# Patient Record
Sex: FEMALE | Race: WHITE | URBAN - METROPOLITAN AREA
[De-identification: names, ages, dates, MRNs, and addresses within clinical notes are randomized per-mention and may not be internally consistent; named-entity substitution may affect disease eponyms.]

---

## 2018-08-15 ENCOUNTER — HOSPITAL ENCOUNTER (OUTPATIENT)
Dept: PHYSICAL THERAPY | Age: 23
Discharge: HOME | End: 2018-08-15
Payer: COMMERCIAL

## 2018-08-15 PROCEDURE — 97110 THERAPEUTIC EXERCISES: CPT

## 2018-08-15 PROCEDURE — 97161 PT EVAL LOW COMPLEX 20 MIN: CPT

## 2018-08-15 NOTE — THERAPY EVALUATION
Ramiro Navarro  : 1995  Primary: [address]  Secondary:  [address] at [address] [facility]  [address], [address], [address]  [phone]   [phone]          OUTPATIENT PHYSICAL THERAPY:Initial Assessment and Daily Note 8/15/2018    ICD-10: Treatment Diagnosis:   Myalgia (M79.1) Other lack of coordination (R27.8)Mixed incontinence (N39.46) Muscle weakness (generalized) (M62.81)  Precautions/Allergies:   Review of patient's allergies indicates no known allergies. Fall Risk Score: 0 (? 5 = High Risk)  MD Orders: Eval and treat MEDICAL/REFERRING DIAGNOSIS:  Other specified disorders of muscle [M62.89]   DATE OF ONSET: chronic   REFERRING PHYSICIAN: Iva Paul MD  RETURN PHYSICIAN APPOINTMENT: TBD     8/15/2018 INITIAL ASSESSMENT:  Ms. Ta Flattery  year old female with increased tone and muscle spasms of the pelvic floor that may be contributing to her leaking, pelvic pain and low back. She may also have some contributions from the pudendal n. She presents with tenderness at the external vaginal muscles as well as at the introitus and deep vaginal muscles of the LA and OI. She would benefit from skilled PT to address these deficits with the use of manual therapy, relaxation techniques, behavior modification and modalities as needed. PROBLEM LIST (Impacting functional limitations):  1. Increased Pain  2. Decreased Activity Tolerance  3. Decreased [place] with Home Exercise Program  4. Decreased strength and coordination of pelvic floor which limits bladder control   5. INTERVENTIONS PLANNED:  1. Neuromuscular re-education  2. Biofeedback as needed  3. HEP  4. Bladder retraining  5. Bladder education  6. Electrical Stimulation  7. Home Exercise Program (HEP)  8. Manual Therapy  9. Neuromuscular Re-education/Strengthening  10. Range of Motion (ROM)  11. Therapeutic Activites  12.  Therapeutic Exercise/Strengthening   TREATMENT PLAN:  Effective Dates: 8/15/2018 TO 11/13/2018 (90 days). Frequency/Duration: 1 time a week for 90 Days  GOALS: (Goals have been discussed and agreed upon with patient.)  Short-Term Functional Goals: Time Frame: in 3 weeks  1. Patient will demonstrate independence with home exercise program.  2. Patient will demonstrated good coordination of pelvic floor muscles to improve continence. Discharge Goals: Time Frame: in 12 weeks  Decrease resting tone of pelvic floor muscles to 0 mv on SEMG biofeedback to demonstrate ability to relax pelvic floor  Patient will sleep uninterrupted by excessive nocturia no greater than 2x per night. Pt will be able to participate in 30 min of aerobic activities without urinary incontinence. Pt will report a 50 % decrease in urinary incontinent episodes per voiding log in order to decrease social anxiety. Rehabilitation Potential For Stated Goals: Good  Regarding Toño Gutierresridge's therapy, I certify that the treatment plan above will be carried out by a therapist or under their direction. Thank you for this referral,  Kaylin Maza, PT     Referring Physician Signature: Jacek Muñoz MD              Date                    The information in this section was collected on 8/15/2018   (except where otherwise noted). HISTORY:   History of Present Injury/Illness (Reason for Referral):  Has always had frequency in urination. Had a baby 2016 and the frequency. Urinates every two hours. Second baby at 5 months had a foul smelling urine. Had pain on the right side. Pain is dependant on what she eats. Urine streams is two steady streams. Has pain instead of urgency which lets her know that she has to go to bathroom. Leaks about a teaspoon size per day. Second child was born last year. Has quite a bit of anxiety. Past Medical History/Comorbidities:   Ms. Tayler Walters  has a past medical history of IBS (irritable bowel syndrome).   Ms. Tayler Walters  has a past surgical history that includes hx tonsil and adenoidectomy. Social History/Living Environment:   Lives with fiance and two children  Prior Level of Function/Work/Activity:  Chronic issues with anxiety and urination  Previous Treatment Approaches:          URO/GYN  Lots of testing primary care. Checked out fine  Current Medications:    Current Outpatient Prescriptions:     norgestimate-ethinyl estradiol (TRI-LO-SPRINTEC) 0.18/0.215/0.25 mg-25 mcg tab, Take 1 Tab by mouth daily. , Disp: 28 Tab, Rfl: 3   Date Last Reviewed:  8/15/2018   Voiding Patterns:  Patient voids every 2 hours during the day and 2-3 times during the night. Patient reports that she empties bladder . Feels relief when she empties bladder in regards to pain. Fluid Intake: Patient does not limit fluid intake to control bladder. Drinks 40oz of water. Coffee 2 x per week. Bowel Habits:  Patient demonstrates normal bowel habits. Personal / Social History:  · Sexually active? Yes. Painful with insertion and after sex she has pain at the vulva and at some afters.    · Has had sexual trama       Number of Personal Factors/Comorbidities that affect the Plan of Care: 1-2: MODERATE COMPLEXITY   EXAMINATION:   Internal Observation:   · Voluntary Contraction: good with inability to relax    · Voluntary Relaxation: poor coordination   · Involuntary Contraction: NT  · Involuntary Relaxation: NT  · Perineal Body Assessment: tenderness  · Anal [place]: NT  · Skin Integrity:  Irritated rubor   · Q-tip Test: NT  · Vaginal Vault Size: within normal limits            Palpation:   Right Left   Bulbocavernosus     Ischiocavernosus     Superficial Transverse Perineal tenderness tenderness   Sphincter Urethrae     Compressor Urethra      Urethra-vaginalis     Deep Transverse Perineium tenderness tenderness   Obturator Internus Pain 6/10 Pain 3/10   Iliococcygeus Discomfort 2/10 Pain 3/10   Coccygeus NT Nt   Pubococcygeus Discomfort 2/10 Pain 7/10   Levator Ani     Adductor     Psoas Pain 6/10 Pain 2/10 Ant. Abdominal wall             Prolapse:  · Tissue support test with bearing down (Grade 1: not visible at introitus; Grade 2: visible at introitus; Grade 3: tissue outside introitus):  · Anterior Wall = 0  · Posterior Wall = 0   · Apical = 0       Body Structures Involved:  1. Muscles Body Functions Affected:  1. Genitourinary  2. Reproductive  3. Neuromusculoskeletal Activities and Participation Affected:  1. Self Care  2. Interpersonal Interactions and Relationships   Number of elements that affect the Plan of Care: 3: MODERATE COMPLEXITY   CLINICAL PRESENTATION:   Presentation: Evolving clinical presentation with changing clinical characteristics: MODERATE COMPLEXITY   CLINICAL DECISION MAKING:   Outcome Measure: Tool Used: Pelvic Floor Impact Questionnaireshort form 7 (PFIQ-7)   Score:  Initial:   · Bladder or Urine: 15  · Bowel or Rectum: 5  · Vagina or Pelvis: 4 Most Recent: X (Date: -- )  · Bladder or Urine: X  · Bowel or Rectum: X  · Vagina or Pelvis: X   Interpretation of Score: Each of the 7 sections is scored on a scale from 0-3; 0 representing \"Not at all\", 3 representing \"Quite a bit\". The mean value is taken from all the answered items, then multiplied by 100 to obtain the scale score, ranging from 0-100. This process is repeated for each column representing bowel, bladder, and pelvic pain. Medical Necessity:   · Patient is expected to demonstrate progress in coordination to increase independence with relaxation of pelvic floor muscles and improve strength. .  · Patient demonstrates good rehab potential due to higher previous functional level. Reason for Services/Other Comments:  · Patient continues to require skilled intervention due to improved continence.    Use of outcome tool(s) and clinical judgement create a POC that gives a: Questionable prediction of patient's progress: MODERATE COMPLEXITY   TREATMENT:   (In addition to Assessment/Re-Assessment sessions the following treatments were rendered)  Pre-treatment Symptoms/Complaints:  See above  Pain: Initial:   Pain Intensity 1: 3  Pain Location 1: Pelvis  Post Session:  3     THERAPEUTIC EXERCISE: (20 minutes):  Exercises per grid below to improve coordination. Required moderate verbal and tactile cues to promote proper body breathing techniques. Progressed repetitions and complexity of movement as indicated. (Used abbreviations: MET - muscle energy technique; SCS- Strain counter strain; CTM- Connective tissue mobilizations; CR- Contract/relax; SP- Sustained pressure, TrP- Trigger point release, IASTM- Instrument assisted soft tissue mobilizations, TDN- Trigger point dry needling). Exercises:  Patient instructed in pelvic floor exercises listed below:   Date:  8/15/2018     Activity/Exercise Parameters   Pt education  10 min see below   PF drops X 10 with tactile feedback   Diaphragmatic breathing 7 min                           [employer] Portal    The following educational topics were reviewed with patient:  Bladder health, tips to control urge, bladder diary, pelvic floor anatomy, how foods affect bladder, bladder retraining. Treatment/Session Assessment:    · Response to Treatment:   Pt reported good understanding of plan of care as well as exercises. All questions were answered and pt. Invited to call with any further questions or issues. Had no increased pain after treatment. · Compliance with Program/Exercises: Will assess as treatment progresses. · Recommendations/Intent for next treatment session: \"Next visit will focus on advancements to more challenging activities\".   Total Treatment Duration:  PT Patient Time In/Time Out  Time In: 1100  Time Out: 1200    Bindu Harris, PT

## 2018-08-15 NOTE — PROGRESS NOTES
Ambulatory/Rehab Services H2 Model Falls Risk Assessment    Risk Factor Pts. ·   Confusion/Disorientation/Impulsivity  []    4 ·   Symptomatic Depression  []   2 ·   Altered Elimination  []   1 ·   Dizziness/Vertigo  []   1 ·   Gender (Male)  []   1 ·   Any administered antiepileptics (anticonvulsants):  []   2 ·   Any administered benzodiazepines:  []   1 ·   Visual Impairment (specify):  []   1 ·   Portable Oxygen Use  []   1 ·   Orthostatic ? BP  []   1 ·   History of Recent Falls (within 3 mos.)  []   5     Ability to Rise from Chair (choose one) Pts. ·   Ability to rise in a single movement  [x]   0 ·   Pushes up, successful in one attempt  []   1 ·   Multiple attempts, but successful  []   3 ·   Unable to rise without assistance  []   4   Total: (5 or greater = High Risk) 0     Falls Prevention Plan:   []                Physical Limitations to Exercise (specify):   []                Mobility Assistance Device (type):   []                Exercise/Equipment Adaptation (specify):    ©2010 [facility] [place] Patent #[id number].  Federal Law prohibits the replication, distribution or use without written permission from [facility] [employer]

## 2018-08-22 ENCOUNTER — HOSPITAL ENCOUNTER (OUTPATIENT)
Dept: PHYSICAL THERAPY | Age: 23
Discharge: HOME | End: 2018-08-22
Payer: COMMERCIAL

## 2018-08-22 PROCEDURE — 97110 THERAPEUTIC EXERCISES: CPT

## 2018-08-22 PROCEDURE — 97140 MANUAL THERAPY 1/> REGIONS: CPT

## 2018-08-22 NOTE — PROGRESS NOTES
Naina Whatley  : 1995  Primary: [address]  Secondary:  [address] at [address] [facility]  [address] [facility], [address] [place] [address],[address], [address] [facility]  [phone]   [phone]          OUTPATIENT PHYSICAL THERAPY:Daily Note 2018    ICD-10: Treatment Diagnosis:   Myalgia (M79.1) Other lack of coordination (R27.8)Mixed incontinence (N39.46) Muscle weakness (generalized) (M62.81)  Precautions/Allergies:   Review of patient's allergies indicates no known allergies. Fall Risk Score: 0 (? 5 = High Risk)  MD Orders: Eval and treat MEDICAL/REFERRING DIAGNOSIS:  Other specified disorders of muscle [M62.89]   DATE OF ONSET: chronic   REFERRING PHYSICIAN: Rachid Conn MD  RETURN PHYSICIAN APPOINTMENT: TBD     8/15/2018 INITIAL ASSESSMENT:  Ms. Nina Guzmans  year old female with increased tone and muscle spasms of the pelvic floor that may be contributing to her leaking, pelvic pain and low back. She may also have some contributions from the pudendal n. She presents with tenderness at the external vaginal muscles as well as at the introitus and deep vaginal muscles of the LA and OI. She would benefit from skilled PT to address these deficits with the use of manual therapy, relaxation techniques, behavior modification and modalities as needed. PROBLEM LIST (Impacting functional limitations):  1. Increased Pain  2. Decreased Activity Tolerance  3. Decreased [place] with Home Exercise Program  4. Decreased strength and coordination of pelvic floor which limits bladder control   5. INTERVENTIONS PLANNED:  1. Neuromuscular re-education  2. Biofeedback as needed  3. HEP  4. Bladder retraining  5. Bladder education  6. Electrical Stimulation  7. Home Exercise Program (HEP)  8. Manual Therapy  9. Neuromuscular Re-education/Strengthening  10. Range of Motion (ROM)  11. Therapeutic Activites  12.  Therapeutic Exercise/Strengthening   TREATMENT PLAN:  Effective Dates: 8/15/2018 TO 11/13/2018 (90 days). Frequency/Duration: 1 time a week for 90 Days  GOALS: (Goals have been discussed and agreed upon with patient.)  Short-Term Functional Goals: Time Frame: in 3 weeks  1. Patient will demonstrate independence with home exercise program.  2. Patient will demonstrated good coordination of pelvic floor muscles to improve continence. Discharge Goals: Time Frame: in 12 weeks  Decrease resting tone of pelvic floor muscles to 0 mv on SEMG biofeedback to demonstrate ability to relax pelvic floor  Patient will sleep uninterrupted by excessive nocturia no greater than 2x per night. Pt will be able to participate in 30 min of aerobic activities without urinary incontinence. Pt will report a 50 % decrease in urinary incontinent episodes per voiding log in order to decrease social anxiety. Rehabilitation Potential For Stated Goals: Good  Regarding Vero Guzmán's therapy, I certify that the treatment plan above will be carried out by a therapist or under their direction. Thank you for this referral,  Rukhsana Ibanez, PT     Referring Physician Signature: Kasandra Long MD              Date                    The information in this section was collected on 8/22/2018   (except where otherwise noted). HISTORY:   History of Present Injury/Illness (Reason for Referral):  Has always had frequency in urination. Had a baby 2016 and the frequency. Urinates every two hours. Second baby at 5 months had a foul smelling urine. Had pain on the right side. Pain is dependant on what she eats. Urine streams is two steady streams. Has pain instead of urgency which lets her know that she has to go to bathroom. Leaks about a teaspoon size per day. Second child was born last year. Has quite a bit of anxiety. Past Medical History/Comorbidities:   Ms. Gerson Ferrer  has a past medical history of IBS (irritable bowel syndrome). Ms. Gesron Ferrer  has a past surgical history that includes hx tonsil and adenoidectomy.   Social History/Living Environment:   Lives with fiance and two children  Prior Level of Function/Work/Activity:  Chronic issues with anxiety and urination  Previous Treatment Approaches:          URO/GYN  Lots of testing primary care. Checked out fine  Current Medications:    Current Outpatient Prescriptions:     norgestimate-ethinyl estradiol (TRI-LO-SPRINTEC) 0.18/0.215/0.25 mg-25 mcg tab, Take 1 Tab by mouth daily. , Disp: 28 Tab, Rfl: 3   Date Last Reviewed:  8/22/2018   Voiding Patterns:  Patient voids every 2 hours during the day and 2-3 times during the night. Patient reports that she empties bladder . Feels relief when she empties bladder in regards to pain. Fluid Intake: Patient does not limit fluid intake to control bladder. Drinks 40oz of water. Coffee 2 x per week. Bowel Habits:  Patient demonstrates normal bowel habits. Personal / Social History:  · Sexually active? Yes. Painful with insertion and after sex she has pain at the vulva and at some afters. · Has had sexual trama       Number of Personal Factors/Comorbidities that affect the Plan of Care: 1-2: MODERATE COMPLEXITY   EXAMINATION:   Internal Observation:   · Voluntary Contraction: good with inability to relax    · Voluntary Relaxation: poor coordination   · Involuntary Contraction: NT  · Involuntary Relaxation: NT  · Perineal Body Assessment: tenderness  · Anal [place]: NT  · Skin Integrity:  Irritated rubor   · Q-tip Test: NT  · Vaginal Vault Size: within normal limits            Palpation:   Right Left   Bulbocavernosus     Ischiocavernosus     Superficial Transverse Perineal tenderness tenderness   Sphincter Urethrae     Compressor Urethra      Urethra-vaginalis     Deep Transverse Perineium tenderness tenderness   Obturator Internus Pain 6/10 Pain 3/10   Iliococcygeus Discomfort 2/10 Pain 3/10   Coccygeus NT Nt   Pubococcygeus Discomfort 2/10 Pain 7/10   Levator Ani     Adductor     Psoas Pain 6/10 Pain 2/10   Ant.  Abdominal wall Prolapse:  · Tissue support test with bearing down (Grade 1: not visible at introitus; Grade 2: visible at introitus; Grade 3: tissue outside introitus):  · Anterior Wall = 0  · Posterior Wall = 0   · Apical = 0       Body Structures Involved:  1. Muscles Body Functions Affected:  1. Genitourinary  2. Reproductive  3. Neuromusculoskeletal Activities and Participation Affected:  1. Self Care  2. Interpersonal Interactions and Relationships   Number of elements that affect the Plan of Care: 3: MODERATE COMPLEXITY   CLINICAL PRESENTATION:   Presentation: Evolving clinical presentation with changing clinical characteristics: MODERATE COMPLEXITY   CLINICAL DECISION MAKING:   Outcome Measure: Tool Used: Pelvic Floor Impact Questionnaireshort form 7 (PFIQ-7)   Score:  Initial:   · Bladder or Urine: 15  · Bowel or Rectum: 5  · Vagina or Pelvis: 4 Most Recent: X (Date: -- )  · Bladder or Urine: X  · Bowel or Rectum: X  · Vagina or Pelvis: X   Interpretation of Score: Each of the 7 sections is scored on a scale from 0-3; 0 representing \"Not at all\", 3 representing \"Quite a bit\". The mean value is taken from all the answered items, then multiplied by 100 to obtain the scale score, ranging from 0-100. This process is repeated for each column representing bowel, bladder, and pelvic pain. Medical Necessity:   · Patient is expected to demonstrate progress in coordination to increase independence with relaxation of pelvic floor muscles and improve strength. .  · Patient demonstrates good rehab potential due to higher previous functional level. Reason for Services/Other Comments:  · Patient continues to require skilled intervention due to improved continence.    Use of outcome tool(s) and clinical judgement create a POC that gives a: Questionable prediction of patient's progress: MODERATE COMPLEXITY   TREATMENT:   (In addition to Assessment/Re-Assessment sessions the following treatments were rendered)  Pre-treatment Symptoms/Complaints: Does have her menstrual cycle right now and has some cramping. Feels like the breathing is better. Pain: Initial:   Pain Intensity 1: 3  Pain Location 1: Pelvis 2/10 Post Session:  1/10     THERAPEUTIC EXERCISE: (10 minutes):  Exercises per grid below to improve coordination. Required moderate verbal and tactile cues to promote proper body breathing techniques. Progressed repetitions and complexity of movement as indicated. MANUAL THERAPY: (30 minutes): Soft tissue mobilization was utilized and necessary because of the patient's restricted motion of soft tissue. (Used abbreviations: MET - muscle energy technique; SCS- Strain counter strain; CTM- Connective tissue mobilizations; CR- Contract/relax; SP- Sustained pressure, TrP- Trigger point release, IASTM- Instrument assisted soft tissue mobilizations, TDN- Trigger point dry needling). Exercises:  Patient instructed in pelvic floor exercises listed below:   Date:  8/22/2018     Activity/Exercise Parameters   Pt education     PF drops X 10 with tactile feedback   Diaphragmatic breathing 5 min   Adductor stretch 3 x 30 sec    Perineum stretch 3 x 30 sec   Piriformis stertch 3 x 30 sec               [employer] Portal    The following educational topics were reviewed with patient:    Treatment/Session Assessment:    · Response to Treatment:   Pt states that she doesn't have vaginal pain, but pelvic pain feels good. Will do her stretching at home for HEP. · Compliance with Program/Exercises: Will assess as treatment progresses. · Recommendations/Intent for next treatment session: \"Next visit will focus on advancements to more challenging activities\".   Total Treatment Duration:  PT Patient Time In/Time Out  Time In: 1315  Time Out: 0200    Antoni Arguelles, PT

## 2018-08-29 ENCOUNTER — HOSPITAL ENCOUNTER (OUTPATIENT)
Dept: PHYSICAL THERAPY | Age: 23
Discharge: HOME | End: 2018-08-29
Payer: COMMERCIAL

## 2018-08-29 PROCEDURE — 97140 MANUAL THERAPY 1/> REGIONS: CPT

## 2018-08-29 PROCEDURE — 97110 THERAPEUTIC EXERCISES: CPT

## 2018-08-29 NOTE — PROGRESS NOTES
Bentley Matthew : 1995 Primary: [address] Secondary:  [facility] [address], [address], [address] [facility] [phone]   [phone] OUTPATIENT PHYSICAL THERAPY:Daily Note 2018 ICD-10: Treatment Diagnosis: Myalgia (M79.1) Other lack of coordination (R27.8)Mixed incontinence (N39.46) Muscle weakness (generalized) (M62.81) Precautions/Allergies:  
Review of patient's allergies indicates no known allergies. Fall Risk Score: 0 (? 5 = High Risk) MD Orders: Eval and treat MEDICAL/REFERRING DIAGNOSIS: 
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 
RETURN PHYSICIAN APPOINTMENT: TBD  
 
8/15/2018 INITIAL ASSESSMENT:  Ms. Rafael Abraham  year old female with increased tone and muscle spasms of the pelvic floor that may be contributing to her leaking, pelvic pain and low back. She may also have some contributions from the pudendal n. She presents with tenderness at the external vaginal muscles as well as at the introitus and deep vaginal muscles of the LA and OI. She would benefit from skilled PT to address these deficits with the use of manual therapy, relaxation techniques, behavior modification and modalities as needed. PROBLEM LIST (Impacting functional limitations): 
1. Increased Pain 2. Decreased Activity Tolerance 3. Decreased [place] with Home Exercise Program 
4. Decreased strength and coordination of pelvic floor which limits bladder control 5. INTERVENTIONS PLANNED: 
1. Neuromuscular re-education 2. Biofeedback as needed 3. HEP 4. Bladder retraining 5. Bladder education 6. Electrical Stimulation 7. Home Exercise Program (HEP) 8. Manual Therapy 9. Neuromuscular Re-education/Strengthening 10. Range of Motion (ROM) 11. Therapeutic Activites 12. Therapeutic Exercise/Strengthening TREATMENT PLAN: 
 Effective Dates: 8/15/2018 TO 11/13/2018 (90 days). Frequency/Duration: 1 time a week for 90 Days GOALS: (Goals have been discussed and agreed upon with patient.) Short-Term Functional Goals: Time Frame: in 3 weeks 1. Patient will demonstrate independence with home exercise program. 
2. Patient will demonstrated good coordination of pelvic floor muscles to improve continence. Discharge Goals: Time Frame: in 12 weeks Decrease resting tone of pelvic floor muscles to 0 mv on SEMG biofeedback to demonstrate ability to relax pelvic floor Patient will sleep uninterrupted by excessive nocturia no greater than 2x per night. Pt will be able to participate in 30 min of aerobic activities without urinary incontinence. Pt will report a 50 % decrease in urinary incontinent episodes per voiding log in order to decrease social anxiety. Rehabilitation Potential For Stated Goals: Good Regarding Malik Guzmán's therapy, I certify that the treatment plan above will be carried out by a therapist or under their direction. Thank you for this referral, Fernando Graham, PT Referring Physician Signature: Johanna Ascencio MD              Date The information in this section was collected on 8/29/2018 
 (except where otherwise noted). HISTORY:  
History of Present Injury/Illness (Reason for Referral): 
Has always had frequency in urination. Had a baby 2016 and the frequency. Urinates every two hours. Second baby at 5 months had a foul smelling urine. Had pain on the right side. Pain is dependant on what she eats. Urine streams is two steady streams. Has pain instead of urgency which lets her know that she has to go to bathroom. Leaks about a teaspoon size per day. Second child was born last year. Has quite a bit of anxiety. Past Medical History/Comorbidities:  
Ms. Aysha Ferguson  has a past medical history of IBS (irritable bowel syndrome). Ms. Pawel Sebastian  has a past surgical history that includes hx tonsil and adenoidectomy. Social History/Living Environment:  
Lives with fiance and two children Prior Level of Function/Work/Activity: 
Chronic issues with anxiety and urination Previous Treatment Approaches: URO/GYN  Lots of testing primary care. Checked out fine Current Medications:   
Current Outpatient Prescriptions:  
  norgestimate-ethinyl estradiol (TRI-LO-SPRINTEC) 0.18/0.215/0.25 mg-25 mcg tab, Take 1 Tab by mouth daily. , Disp: 28 Tab, Rfl: 3 Date Last Reviewed:  8/29/2018 Voiding Patterns:  Patient voids every 2 hours during the day and 2-3 times during the night. Patient reports that she empties bladder . Feels relief when she empties bladder in regards to pain. Fluid Intake: Patient does not limit fluid intake to control bladder. Drinks 40oz of water. Coffee 2 x per week. Bowel Habits:  Patient demonstrates normal bowel habits. Personal / Social History: 
· Sexually active? Yes. Painful with insertion and after sex she has pain at the vulva and at some afters. · Has had sexual trama Number of Personal Factors/Comorbidities that affect the Plan of Care: 1-2: MODERATE COMPLEXITY EXAMINATION:  
Internal Observation: · Voluntary Contraction: good with inability to relax · Voluntary Relaxation: poor coordination · Involuntary Contraction: NT 
· Involuntary Relaxation: NT 
· Perineal Body Assessment: tenderness · Anal [place]: NT 
· Skin Integrity:  Irritated rubor · Q-tip Test: NT 
· Vaginal Vault Size: within normal limits Palpation: 
 Right Left Bulbocavernosus Ischiocavernosus Superficial Transverse Perineal tenderness tenderness Sphincter Urethrae Compressor Urethra [place] Deep Transverse Perineium tenderness tenderness Obturator Internus Pain 6/10 Pain 3/10 Iliococcygeus Discomfort 2/10 Pain 3/10 Coccygeus NT Nt  
Pubococcygeus Discomfort 2/10 Pain 7/10 Levator Ani Adductor Psoas Pain 6/10 Pain 2/10 Ant. Abdominal wall Prolapse: · Tissue support test with bearing down (Grade 1: not visible at introitus; Grade 2: visible at introitus; Grade 3: tissue outside introitus): · Anterior Wall = 0 
· Posterior Wall = 0  
· Apical = 0 Body Structures Involved: 1. Muscles Body Functions Affected: 1. Genitourinary 2. Reproductive 3. Neuromusculoskeletal Activities and Participation Affected: 1. Self Care 2. Interpersonal Interactions and Relationships Number of elements that affect the Plan of Care: 3: MODERATE COMPLEXITY CLINICAL PRESENTATION:  
Presentation: Evolving clinical presentation with changing clinical characteristics: MODERATE COMPLEXITY CLINICAL DECISION MAKING:  
Outcome Measure: Tool Used: Pelvic Floor Impact Questionnaireshort form 7 (PFIQ-7) Score:  Initial: · Bladder or Urine: 15 · Bowel or Rectum: 5 · Vagina or Pelvis: 4 Most Recent: X (Date: -- ) · Bladder or Urine: X 
· Bowel or Rectum: X · Vagina or Pelvis: X Interpretation of Score: Each of the 7 sections is scored on a scale from 0-3; 0 representing \"Not at all\", 3 representing \"Quite a bit\". The mean value is taken from all the answered items, then multiplied by 100 to obtain the scale score, ranging from 0-100. This process is repeated for each column representing bowel, bladder, and pelvic pain. Medical Necessity:  
· Patient is expected to demonstrate progress in coordination to increase independence with relaxation of pelvic floor muscles and improve strength. Rahel Villatoro · Patient demonstrates good rehab potential due to higher previous functional level. Reason for Services/Other Comments: 
· Patient continues to require skilled intervention due to improved continence.   
Use of outcome tool(s) and clinical judgement create a POC that gives a: Questionable prediction of patient's progress: MODERATE COMPLEXITY  
TREATMENT:  
 (In addition to Assessment/Re-Assessment sessions the following treatments were rendered) Pre-treatment Symptoms/Complaints: Pain currently is not bad. More irritation at the urethra. Pain: Initial:  
Pain Intensity 1: 4 Pain Location 1: Pelvis 2/10 Post Session:  1/10 THERAPEUTIC EXERCISE: (12 minutes):  Exercises per grid below to improve coordination. Required moderate verbal and tactile cues to promote proper body breathing techniques. Progressed repetitions and complexity of movement as indicated. MANUAL THERAPY: (45 minutes): Soft tissue mobilization was utilized and necessary because of the patient's restricted motion of soft tissue. (Used abbreviations: MET - muscle energy technique; SCS- Strain counter strain; CTM- Connective tissue mobilizations; CR- Contract/relax; SP- Sustained pressure, TrP- Trigger point release, IASTM- Instrument assisted soft tissue mobilizations, TDN- Trigger point dry needling). SCS and Trigger point to bilateral LA, OI, and perineum to reduce tone and improve tissue elasticity. Rolling to adductors and abdomen. TrP to right psoas. Strumming and trigger point to bulbocavernosus, ischiocavernosus, superficial transverse perineum Hip lateral distraction right 3 x30 sec with belt Hip distraction long axis right x 20 sec PA to left superior sacrum 40 sec MET right LE extension and left LE flexion 3 x 7 sec Exercises:  Patient instructed in pelvic floor exercises listed below: 
 Date: 
8/29/2018 Activity/Exercise Parameters Pt education PF drops X 10 with tactile feedback Diaphragmatic breathing 5 min Adductor stretch 3 x 30 sec Perineum stretch 3 x 30 sec Piriformis stertch 3 x 30 sec Hip flexor stretch 3 x 30 sec right 1/2 kneelling psoas stretch 3 x 20 sec  D/C due to poor posture  
squats 2 x 20 sec [facility] Portal 
 
The following educational topics were reviewed with patient:   
Treatment/Session Assessment: · Response to Treatment:   Pt continues to have good relief of symptoms with stretching and MET. She has unstable SI we will continue to work on core stabilization in the next several visits. Continues to have increased tone in pelvic floor. · Compliance with Program/Exercises: Will assess as treatment progresses. · Recommendations/Intent for next treatment session: \"Next visit will focus on advancements to more challenging activities\". Total Treatment Duration: PT Patient Time In/Time Out Time In: 1100 Time Out: 5128 Gracia Caraballo, PT

## 2018-09-05 ENCOUNTER — HOSPITAL ENCOUNTER (OUTPATIENT)
Dept: PHYSICAL THERAPY | Age: 23
Discharge: HOME | End: 2018-09-05
Payer: COMMERCIAL

## 2018-09-05 PROCEDURE — 97140 MANUAL THERAPY 1/> REGIONS: CPT

## 2018-09-05 PROCEDURE — 97110 THERAPEUTIC EXERCISES: CPT

## 2018-09-05 NOTE — PROGRESS NOTES
Marvin Hayward : 1995 Primary: [address] Secondary:  [facility] [place] [id number] [facility], [address], [place]. [phone]   [phone] OUTPATIENT PHYSICAL THERAPY:Daily Note 2018 ICD-10: Treatment Diagnosis: Myalgia (M79.1) Other lack of coordination (R27.8)Mixed incontinence (N39.46) Muscle weakness (generalized) (M62.81) Precautions/Allergies:  
Review of patient's allergies indicates no known allergies. Fall Risk Score: 0 (? 5 = High Risk) MD Orders: Eval and treat MEDICAL/REFERRING DIAGNOSIS: 
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 
RETURN PHYSICIAN APPOINTMENT: TBD  
 
8/15/2018 INITIAL ASSESSMENT:  Ms. Agustin Part  year old female with increased tone and muscle spasms of the pelvic floor that may be contributing to her leaking, pelvic pain and low back. She may also have some contributions from the pudendal n. She presents with tenderness at the external vaginal muscles as well as at the introitus and deep vaginal muscles of the LA and OI. She would benefit from skilled PT to address these deficits with the use of manual therapy, relaxation techniques, behavior modification and modalities as needed. PROBLEM LIST (Impacting functional limitations): 
1. Increased Pain 2. Decreased Activity Tolerance 3. Decreased [place] with Home Exercise Program 
4. Decreased strength and coordination of pelvic floor which limits bladder control 5. INTERVENTIONS PLANNED: 
1. Neuromuscular re-education 2. Biofeedback as needed 3. HEP 4. Bladder retraining 5. Bladder education 6. Electrical Stimulation 7. Home Exercise Program (HEP) 8. Manual Therapy 9. Neuromuscular Re-education/Strengthening 10. Range of Motion (ROM) 11. Therapeutic Activites 12. Therapeutic Exercise/Strengthening TREATMENT PLAN: 
 Effective Dates: 8/15/2018 TO 11/13/2018 (90 days). Frequency/Duration: 1 time a week for 90 Days GOALS: (Goals have been discussed and agreed upon with patient.) Short-Term Functional Goals: Time Frame: in 3 weeks 1. Patient will demonstrate independence with home exercise program. 
2. Patient will demonstrated good coordination of pelvic floor muscles to improve continence. Discharge Goals: Time Frame: in 12 weeks Decrease resting tone of pelvic floor muscles to 0 mv on SEMG biofeedback to demonstrate ability to relax pelvic floor Patient will sleep uninterrupted by excessive nocturia no greater than 2x per night. Pt will be able to participate in 30 min of aerobic activities without urinary incontinence. Pt will report a 50 % decrease in urinary incontinent episodes per voiding log in order to decrease social anxiety. Rehabilitation Potential For Stated Goals: Good Regarding Jennifer Gutierresridge's therapy, I certify that the treatment plan above will be carried out by a therapist or under their direction. Thank you for this referral, Jeffrey August, PT Referring Physician Signature: Lyndsey Forrest MD              Date The information in this section was collected on 9/5/2018 
 (except where otherwise noted). HISTORY:  
History of Present Injury/Illness (Reason for Referral): 
Has always had frequency in urination. Had a baby 2016 and the frequency. Urinates every two hours. Second baby at 5 months had a foul smelling urine. Had pain on the right side. Pain is dependant on what she eats. Urine streams is two steady streams. Has pain instead of urgency which lets her know that she has to go to bathroom. Leaks about a teaspoon size per day. Second child was born last year. Has quite a bit of anxiety. Past Medical History/Comorbidities:  
Ms. Dilia Mi  has a past medical history of IBS (irritable bowel syndrome). Ms. Andreas Xavier  has a past surgical history that includes hx tonsil and adenoidectomy. Social History/Living Environment:  
Lives with fiance and two children Prior Level of Function/Work/Activity: 
Chronic issues with anxiety and urination Previous Treatment Approaches: URO/GYN  Lots of testing primary care. Checked out fine Current Medications:   
Current Outpatient Prescriptions:  
  norgestimate-ethinyl estradiol (TRI-LO-SPRINTEC) 0.18/0.215/0.25 mg-25 mcg tab, Take 1 Tab by mouth daily. , Disp: 28 Tab, Rfl: 3 Date Last Reviewed:  9/5/2018 Voiding Patterns:  Patient voids every 2 hours during the day and 2-3 times during the night. Patient reports that she empties bladder . Feels relief when she empties bladder in regards to pain. Fluid Intake: Patient does not limit fluid intake to control bladder. Drinks 40oz of water. Coffee 2 x per week. Bowel Habits:  Patient demonstrates normal bowel habits. Personal / Social History: 
· Sexually active? Yes. Painful with insertion and after sex she has pain at the vulva and at some afters. · Has had sexual trama Number of Personal Factors/Comorbidities that affect the Plan of Care: 1-2: MODERATE COMPLEXITY EXAMINATION:  
Internal Observation: · Voluntary Contraction: good with inability to relax · Voluntary Relaxation: poor coordination · Involuntary Contraction: NT 
· Involuntary Relaxation: NT 
· Perineal Body Assessment: tenderness · Anal [place]: NT 
· Skin Integrity:  Irritated rubor · Q-tip Test: NT 
· Vaginal Vault Size: within normal limits Palpation: 
 Right Left Bulbocavernosus Ischiocavernosus Superficial Transverse Perineal tenderness tenderness Sphincter Urethrae Compressor Urethra [place]anda Deep Transverse Perineium tenderness tenderness Obturator Internus Pain 6/10 Pain 3/10 Iliococcygeus Discomfort 2/10 Pain 3/10 Coccygeus NT Nt  
Pubococcygeus Discomfort 2/10 Pain 7/10 Levator Ani Adductor Psoas Pain 6/10 Pain 2/10 Ant. Abdominal wall Prolapse: · Tissue support test with bearing down (Grade 1: not visible at introitus; Grade 2: visible at introitus; Grade 3: tissue outside introitus): · Anterior Wall = 0 
· Posterior Wall = 0  
· Apical = 0 Body Structures Involved: 1. Muscles Body Functions Affected: 1. Genitourinary 2. Reproductive 3. Neuromusculoskeletal Activities and Participation Affected: 1. Self Care 2. Interpersonal Interactions and Relationships Number of elements that affect the Plan of Care: 3: MODERATE COMPLEXITY CLINICAL PRESENTATION:  
Presentation: Evolving clinical presentation with changing clinical characteristics: MODERATE COMPLEXITY CLINICAL DECISION MAKING:  
Outcome Measure: Tool Used: Pelvic Floor Impact Questionnaireshort form 7 (PFIQ-7) Score:  Initial: · Bladder or Urine: 15 · Bowel or Rectum: 5 · Vagina or Pelvis: 4 Most Recent: X (Date: -- ) · Bladder or Urine: X 
· Bowel or Rectum: X · Vagina or Pelvis: X Interpretation of Score: Each of the 7 sections is scored on a scale from 0-3; 0 representing \"Not at all\", 3 representing \"Quite a bit\". The mean value is taken from all the answered items, then multiplied by 100 to obtain the scale score, ranging from 0-100. This process is repeated for each column representing bowel, bladder, and pelvic pain. Medical Necessity:  
· Patient is expected to demonstrate progress in coordination to increase independence with relaxation of pelvic floor muscles and improve strength. Peng Malone · Patient demonstrates good rehab potential due to higher previous functional level. Reason for Services/Other Comments: 
· Patient continues to require skilled intervention due to improved continence.   
Use of outcome tool(s) and clinical judgement create a POC that gives a: Questionable prediction of patient's progress: MODERATE COMPLEXITY  
TREATMENT:  
 (In addition to Assessment/Re-Assessment sessions the following treatments were rendered) Pre-treatment Symptoms/Complaints: Was on vacation and drank a lot of coffee and was more active. Did leak more. Has better frequency. Pain: Initial:  
Pain Intensity 1: 4 Pain Location 1: Pelvis  Post Session:  1/10 right hip THERAPEUTIC EXERCISE: (15 minutes):  Exercises per grid below to improve coordination. Required moderate verbal and tactile cues to promote proper body breathing techniques. Progressed repetitions and complexity of movement as indicated. MANUAL THERAPY: (45 minutes): Soft tissue mobilization was utilized and necessary because of the patient's restricted motion of soft tissue. (Used abbreviations: MET - muscle energy technique; SCS- Strain counter strain; CTM- Connective tissue mobilizations; CR- Contract/relax; SP- Sustained pressure, TrP- Trigger point release, IASTM- Instrument assisted soft tissue mobilizations, TDN- Trigger point dry needling). SCS and Trigger point to bilateral LA, OI, and perineum to reduce tone and improve tissue elasticity. Rolling to adductors and abdomen. TrP to right psoas. Strumming and trigger point to bulbocavernosus, ischiocavernosus, superficial transverse perineum Hip lateral distraction right 3 x30 sec with belt (not performed) Hip distraction long axis right x 20 sec (not performed) PA to left superior sacrum 40 sec (not performed) MET leftt LE extension and right LE flexion 3 x 7 sec Exercises:  Patient instructed in pelvic floor exercises listed below: 
 Date: 
9/5/2018 Activity/Exercise Parameters Pt education PF drops X 10 with external anal sensors and biofeedback. 5 min total.  Presented with good resting tone with biofeedback Diaphragmatic breathing Adductor isometric X 10 sec x 3 Perineum stretch Piriformis stertch Hip flexor stretch 3 x 30 sec right 1/2 kneelling psoas stretch   
squats gait 5 min   
bridge 2 x 10 TA sets Clams shells Hip abduction [employer] Portal 
 
The following educational topics were reviewed with patient:   
Treatment/Session Assessment:   
· Response to Treatment:  Pt continues to have increased pain throughout pelvic floor with palpation. Her left obturator internus was most painful today with palpation. It appears that she has poor stabilty and strength of her hips and pelvis. I will begin introducing core stabilization exercises next session as well as hip strengthening. · Compliance with Program/Exercises: Will assess as treatment progresses. · Recommendations/Intent for next treatment session: \"Next visit will focus on advancements to more challenging activities\". Total Treatment Duration: PT Patient Time In/Time Out Time In: 1300 Time Out: 1400 Fernando Graham PT

## 2018-09-12 ENCOUNTER — HOSPITAL ENCOUNTER (OUTPATIENT)
Dept: PHYSICAL THERAPY | Age: 23
Discharge: HOME | End: 2018-09-12
Payer: COMMERCIAL

## 2018-09-12 PROCEDURE — 97140 MANUAL THERAPY 1/> REGIONS: CPT

## 2018-09-12 PROCEDURE — 97110 THERAPEUTIC EXERCISES: CPT

## 2018-09-12 NOTE — PROGRESS NOTES
Erica Gregory : 1995 Primary: [address] Secondary:  [facility]rapy Center at [facility] [address], [address], [place]. [phone]   [phone] OUTPATIENT PHYSICAL THERAPY:Daily Note 2018 ICD-10: Treatment Diagnosis: Myalgia (M79.1) Other lack of coordination (R27.8)Mixed incontinence (N39.46) Muscle weakness (generalized) (M62.81) Precautions/Allergies:  
Review of patient's allergies indicates no known allergies. Fall Risk Score: 0 (? 5 = High Risk) MD Orders: Eval and treat MEDICAL/REFERRING DIAGNOSIS: 
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 
RETURN PHYSICIAN APPOINTMENT: TBD  
 
8/15/2018 INITIAL ASSESSMENT:  Ms. Maya Menjivars  year old female with increased tone and muscle spasms of the pelvic floor that may be contributing to her leaking, pelvic pain and low back. She may also have some contributions from the pudendal n. She presents with tenderness at the external vaginal muscles as well as at the introitus and deep vaginal muscles of the LA and OI. She would benefit from skilled PT to address these deficits with the use of manual therapy, relaxation techniques, behavior modification and modalities as needed. PROBLEM LIST (Impacting functional limitations): 
1. Increased Pain 2. Decreased Activity Tolerance 3. Decreased [place] with Home Exercise Program 
4. Decreased strength and coordination of pelvic floor which limits bladder control 5. INTERVENTIONS PLANNED: 
1. Neuromuscular re-education 2. Biofeedback as needed 3. HEP 4. Bladder retraining 5. Bladder education 6. Electrical Stimulation 7. Home Exercise Program (HEP) 8. Manual Therapy 9. Neuromuscular Re-education/Strengthening 10. Range of Motion (ROM) 11. Therapeutic Activites 12. Therapeutic Exercise/Strengthening TREATMENT PLAN: 
 Effective Dates: 8/15/2018 TO 11/13/2018 (90 days). Frequency/Duration: 1 time a week for 90 Days GOALS: (Goals have been discussed and agreed upon with patient.) Short-Term Functional Goals: Time Frame: in 3 weeks 1. Patient will demonstrate independence with home exercise program. 
2. Patient will demonstrated good coordination of pelvic floor muscles to improve continence. Discharge Goals: Time Frame: in 12 weeks Decrease resting tone of pelvic floor muscles to 0 mv on SEMG biofeedback to demonstrate ability to relax pelvic floor Patient will sleep uninterrupted by excessive nocturia no greater than 2x per night. Pt will be able to participate in 30 min of aerobic activities without urinary incontinence. Pt will report a 50 % decrease in urinary incontinent episodes per voiding log in order to decrease social anxiety. Rehabilitation Potential For Stated Goals: Good Regarding Williams Britney Guzmán's therapy, I certify that the treatment plan above will be carried out by a therapist or under their direction. Thank you for this referral, Savage Rogers PT Referring Physician Signature: Ysabel Dewitt MD              Date The information in this section was collected on 9/12/2018 
 (except where otherwise noted). HISTORY:  
History of Present Injury/Illness (Reason for Referral): 
Has always had frequency in urination. Had a baby 2016 and the frequency. Urinates every two hours. Second baby at 5 months had a foul smelling urine. Had pain on the right side. Pain is dependant on what she eats. Urine streams is two steady streams. Has pain instead of urgency which lets her know that she has to go to bathroom. Leaks about a teaspoon size per day. Second child was born last year. Has quite a bit of anxiety. Past Medical History/Comorbidities:  
Ms. Edna Schuler  has a past medical history of IBS (irritable bowel syndrome). Ms. Berenice Vasquez  has a past surgical history that includes hx tonsil and adenoidectomy. Social History/Living Environment:  
Lives with fiance and two children Prior Level of Function/Work/Activity: 
Chronic issues with anxiety and urination Previous Treatment Approaches: URO/GYN  Lots of testing primary care. Checked out fine Current Medications:   
Current Outpatient Prescriptions:  
  norgestimate-ethinyl estradiol (TRI-LO-SPRINTEC) 0.18/0.215/0.25 mg-25 mcg tab, Take 1 Tab by mouth daily. , Disp: 28 Tab, Rfl: 3 Date Last Reviewed:  9/12/2018 Voiding Patterns:  Patient voids every 2 hours during the day and 2-3 times during the night. Patient reports that she empties bladder . Feels relief when she empties bladder in regards to pain. Fluid Intake: Patient does not limit fluid intake to control bladder. Drinks 40oz of water. Coffee 2 x per week. Bowel Habits:  Patient demonstrates normal bowel habits. Personal / Social History: 
· Sexually active? Yes. Painful with insertion and after sex she has pain at the vulva and at some afters. · Has had sexual trama Number of Personal Factors/Comorbidities that affect the Plan of Care: 1-2: MODERATE COMPLEXITY EXAMINATION:  
Internal Observation: · Voluntary Contraction: good with inability to relax · Voluntary Relaxation: poor coordination · Involuntary Contraction: NT 
· Involuntary Relaxation: NT 
· Perineal Body Assessment: tenderness · Anal [place]: NT 
· Skin Integrity:  Irritated rubor · Q-tip Test: NT 
· Vaginal Vault Size: within normal limits Palpation: 
 Right Left Bulbocavernosus Ischiocavernosus Superficial Transverse Perineal tenderness tenderness Sphincter Urethrae Compressor Urethra Everet Gambles Deep Transverse Perineium tenderness tenderness Obturator Internus Pain 6/10 Pain 3/10 Iliococcygeus Discomfort 2/10 Pain 3/10 Coccygeus NT Nt  
Pubococcygeus Discomfort 2/10 Pain 7/10 Levator Ani Adductor Psoas Pain 6/10 Pain 2/10 Ant. Abdominal wall Prolapse: · Tissue support test with bearing down (Grade 1: not visible at introitus; Grade 2: visible at introitus; Grade 3: tissue outside introitus): · Anterior Wall = 0 
· Posterior Wall = 0  
· Apical = 0 Body Structures Involved: 1. Muscles Body Functions Affected: 1. Genitourinary 2. Reproductive 3. Neuromusculoskeletal Activities and Participation Affected: 1. Self Care 2. Interpersonal Interactions and Relationships Number of elements that affect the Plan of Care: 3: MODERATE COMPLEXITY CLINICAL PRESENTATION:  
Presentation: Evolving clinical presentation with changing clinical characteristics: MODERATE COMPLEXITY CLINICAL DECISION MAKING:  
Outcome Measure: Tool Used: Pelvic Floor Impact Questionnaireshort form 7 (PFIQ-7) Score:  Initial: · Bladder or Urine: 15 · Bowel or Rectum: 5 · Vagina or Pelvis: 4 Most Recent: X (Date: -- ) · Bladder or Urine: X 
· Bowel or Rectum: X · Vagina or Pelvis: X Interpretation of Score: Each of the 7 sections is scored on a scale from 0-3; 0 representing \"Not at all\", 3 representing \"Quite a bit\". The mean value is taken from all the answered items, then multiplied by 100 to obtain the scale score, ranging from 0-100. This process is repeated for each column representing bowel, bladder, and pelvic pain. Medical Necessity:  
· Patient is expected to demonstrate progress in coordination to increase independence with relaxation of pelvic floor muscles and improve strength. Sudie Mar · Patient demonstrates good rehab potential due to higher previous functional level. Reason for Services/Other Comments: 
· Patient continues to require skilled intervention due to improved continence.   
Use of outcome tool(s) and clinical judgement create a POC that gives a: Questionable prediction of patient's progress: MODERATE COMPLEXITY  
TREATMENT:  
 (In addition to Assessment/Re-Assessment sessions the following treatments were rendered) Pre-treatment Symptoms/Complaints: Pt state that she did not have any pain yesterday and thinks it is because she stopped drinking coffee. Has had less leaking this week too. Pain: Initial:  
Pain Intensity 1: 2 Pain Location 1: Pelvis  Post Session:  0  
 
THERAPEUTIC EXERCISE: (15 minutes):  Exercises per grid below to improve coordination. Required moderate verbal and tactile cues to promote proper body breathing techniques. Progressed repetitions and complexity of movement as indicated. MANUAL THERAPY: (30 minutes): Soft tissue mobilization was utilized and necessary because of the patient's restricted motion of soft tissue. (Used abbreviations: MET - muscle energy technique; SCS- Strain counter strain; CTM- Connective tissue mobilizations; CR- Contract/relax; SP- Sustained pressure, TrP- Trigger point release, IASTM- Instrument assisted soft tissue mobilizations, TDN- Trigger point dry needling). SCS and Trigger point to bilateral LA, OI, and perineum to reduce tone and improve tissue elasticity. Rolling to adductors and abdomen. TrP to right psoas. Strumming and trigger point to bulbocavernosus, ischiocavernosus, superficial transverse perineum Hip lateral distraction right 3 x30 sec with belt (not performed) Hip distraction long axis right x 20 sec PA to left superior sacrum 30 sec MET leftt LE extension and right LE flexion 3 x 7 sec Exercises:  Patient instructed in pelvic floor exercises listed below: 
 Date: 
9/12/2018 Activity/Exercise Parameters Pt education PF drops X 10 with external anal sensors and biofeedback. 5 min total.  Presented with good resting tone with biofeedback Diaphragmatic breathing Adductor isometric X 10 sec x 3 Perineum stretch 3 x 30 sec Piriformis stertch Hip flexor stretch 3 x 30 sec right 1/2 kneelling psoas stretch   
squats gait 2 min   
bridge 2 x 10 TA sets Clams shells 2 x 15 Hip abduction Cat stretch 3 x 30 sec [employer] Portal 
 
The following educational topics were reviewed with patient:   
Treatment/Session Assessment:   
· Response to Treatment: Pt did excellent with all exercises. No complaints of pain. Will be coming in two weeks due to financial constraints. Giving handouts regarding bladder irritants and HEP. · Compliance with Program/Exercises: Will assess as treatment progresses. · Recommendations/Intent for next treatment session: \"Next visit will focus on advancements to more challenging activities\". Total Treatment Duration: PT Patient Time In/Time Out Time In: 1100 Time Out: 1146 Dasha Cronin, PT

## 2018-10-24 ENCOUNTER — HOSPITAL ENCOUNTER (OUTPATIENT)
Dept: PHYSICAL THERAPY | Age: 23
Discharge: HOME | End: 2018-10-24
Payer: COMMERCIAL

## 2018-10-24 PROCEDURE — 97110 THERAPEUTIC EXERCISES: CPT

## 2018-10-24 PROCEDURE — 97140 MANUAL THERAPY 1/> REGIONS: CPT

## 2018-10-24 NOTE — PROGRESS NOTES
Clarence Mikaela  : 1995  [facility]  Secondary:  [address] at [facility]  [address], [address]  [phone]   [phone]          OUTPATIENT PHYSICAL THERAPY:Daily Note 10/24/2018    ICD-10: Treatment Diagnosis:   Myalgia (M79.1) Other lack of coordination (R27.8)Mixed incontinence (N39.46) Muscle weakness (generalized) (M62.81)  Precautions/Allergies:   Patient has no known allergies. Fall Risk Score: 0 (? 5 = High Risk)  MD Orders: Eval and treat MEDICAL/REFERRING DIAGNOSIS:  Other specified disorders of muscle [M62.89]   DATE OF ONSET: chronic   REFERRING PHYSICIAN: Madeline Mendoza MD  RETURN PHYSICIAN APPOINTMENT: TBD     8/15/2018 INITIAL ASSESSMENT:  Ms. Denis Lancaster  year old female with increased tone and muscle spasms of the pelvic floor that may be contributing to her leaking, pelvic pain and low back. She may also have some contributions from the pudendal n. She presents with tenderness at the external vaginal muscles as well as at the introitus and deep vaginal muscles of the LA and OI. She would benefit from skilled PT to address these deficits with the use of manual therapy, relaxation techniques, behavior modification and modalities as needed. PROBLEM LIST (Impacting functional limitations):  1. Increased Pain  2. Decreased Activity Tolerance  3. Decreased [place] with Home Exercise Program  4. Decreased strength and coordination of pelvic floor which limits bladder control   5. INTERVENTIONS PLANNED:  1. Neuromuscular re-education  2. Biofeedback as needed  3. HEP  4. Bladder retraining  5. Bladder education  6. Electrical Stimulation  7. Home Exercise Program (HEP)  8. Manual Therapy  9. Neuromuscular Re-education/Strengthening  10. Range of Motion (ROM)  11. Therapeutic Activites  12. Therapeutic Exercise/Strengthening   TREATMENT PLAN:  Effective Dates: 8/15/2018 TO 2018 (90 days). Frequency/Duration: 1 time a week for 90 Days  GOALS: (Goals have been discussed and agreed upon with patient.)  Short-Term Functional Goals: Time Frame: in 3 weeks  1. Patient will demonstrate independence with home exercise program.  2. Patient will demonstrated good coordination of pelvic floor muscles to improve continence. Discharge Goals: Time Frame: in 12 weeks  Decrease resting tone of pelvic floor muscles to 0 mv on SEMG biofeedback to demonstrate ability to relax pelvic floor  Patient will sleep uninterrupted by excessive nocturia no greater than 2x per night. Pt will be able to participate in 30 min of aerobic activities without urinary incontinence. Pt will report a 50 % decrease in urinary incontinent episodes per voiding log in order to decrease social anxiety. Rehabilitation Potential For Stated Goals: Good  Regarding Chaparrojelani Pop Guzmán's therapy, I certify that the treatment plan above will be carried out by a therapist or under their direction. Thank you for this referral,  Heaven Smith, PT     Referring Physician Signature: Fabby Madrigal MD              Date                    The information in this section was collected on 10/24/2018   (except where otherwise noted). HISTORY:   History of Present Injury/Illness (Reason for Referral):  Has always had frequency in urination. Had a baby 2016 and the frequency. Urinates every two hours. Second baby at 5 months had a foul smelling urine. Had pain on the right side. Pain is dependant on what she eats. Urine streams is two steady streams. Has pain instead of urgency which lets her know that she has to go to bathroom. Leaks about a teaspoon size per day. Second child was born last year. Has quite a bit of anxiety. Past Medical History/Comorbidities:   Ms. Polina Dexter  has a past medical history of IBS (irritable bowel syndrome). Ms. Polina Dexter  has a past surgical history that includes hx tonsil and adenoidectomy.   Social History/Living Environment:   Lives with fiance and two children  Prior Level of Function/Work/Activity:  Chronic issues with anxiety and urination  Previous Treatment Approaches:          URO/GYN  Lots of testing primary care. Checked out fine  Current Medications:    Current Outpatient Medications:     norgestimate-ethinyl estradiol (TRI-LO-SPRINTEC) 0.18/0.215/0.25 mg-25 mcg tab, Take 1 Tab by mouth daily. , Disp: 28 Tab, Rfl: 3   Date Last Reviewed:  10/24/2018   Voiding Patterns:  Patient voids every 2 hours during the day and 2-3 times during the night. Patient reports that she empties bladder . Feels relief when she empties bladder in regards to pain. Fluid Intake: Patient does not limit fluid intake to control bladder. Drinks 40oz of water. Coffee 2 x per week. Bowel Habits:  Patient demonstrates normal bowel habits. Personal / Social History:  · Sexually active? Yes. Painful with insertion and after sex she has pain at the vulva and at some afters. · Has had sexual trama       Number of Personal Factors/Comorbidities that affect the Plan of Care: 1-2: MODERATE COMPLEXITY   EXAMINATION:   Internal Observation:   · Voluntary Contraction: good with inability to relax    · Voluntary Relaxation: poor coordination   · Involuntary Contraction: NT  · Involuntary Relaxation: NT  · Perineal Body Assessment: tenderness  · Anal [place]: NT  · Skin Integrity:  Irritated rubor   · Q-tip Test: NT  · Vaginal Vault Size: within normal limits            Palpation:   Right Left   Bulbocavernosus     Ischiocavernosus     Superficial Transverse Perineal tenderness tenderness   Sphincter Urethrae     Compressor Urethra      Urethra-vaginalis     Deep Transverse Perineium tenderness tenderness   Obturator Internus Pain 6/10 Pain 3/10   Iliococcygeus Discomfort 2/10 Pain 3/10   Coccygeus NT Nt   Pubococcygeus Discomfort 2/10 Pain 7/10   Levator Ani     Adductor     Psoas Pain 6/10 Pain 2/10   Ant.  Abdominal wall Prolapse:  · Tissue support test with bearing down (Grade 1: not visible at introitus; Grade 2: visible at introitus; Grade 3: tissue outside introitus):  · Anterior Wall = 0  · Posterior Wall = 0   · Apical = 0       Body Structures Involved:  1. Muscles Body Functions Affected:  1. Genitourinary  2. Reproductive  3. Neuromusculoskeletal Activities and Participation Affected:  1. Self Care  2. Interpersonal Interactions and Relationships   Number of elements that affect the Plan of Care: 3: MODERATE COMPLEXITY   CLINICAL PRESENTATION:   Presentation: Evolving clinical presentation with changing clinical characteristics: MODERATE COMPLEXITY   CLINICAL DECISION MAKING:   Outcome Measure: Tool Used: Pelvic Floor Impact Questionnaire--short form 7 (PFIQ-7)   Score:  Initial:   · Bladder or Urine: 15  · Bowel or Rectum: 5  · Vagina or Pelvis: 4 Most Recent: X (Date: -- )  · Bladder or Urine: X  · Bowel or Rectum: X  · Vagina or Pelvis: X   Interpretation of Score: Each of the 7 sections is scored on a scale from 0-3; 0 representing \"Not at all\", 3 representing \"Quite a bit\". The mean value is taken from all the answered items, then multiplied by 100 to obtain the scale score, ranging from 0-100. This process is repeated for each column representing bowel, bladder, and pelvic pain. Medical Necessity:   · Patient is expected to demonstrate progress in coordination to increase independence with relaxation of pelvic floor muscles and improve strength. .  · Patient demonstrates good rehab potential due to higher previous functional level. Reason for Services/Other Comments:  · Patient continues to require skilled intervention due to improved continence.    Use of outcome tool(s) and clinical judgement create a POC that gives a: Questionable prediction of patient's progress: MODERATE COMPLEXITY   TREATMENT:   (In addition to Assessment/Re-Assessment sessions the following treatments were rendered)  Pre-treatment Symptoms/Complaints: Pt had her wisdom teeth taken a few weeks ago. Feels like her who body has fallen apart since the wisdom teeth. Has had a lot of pain from the wisdom teeth. Since she had to take pain killers started to have some stomach and back pain . Has not had much pelvic pain. The leaking has gotten worse. Pain: Initial:   Pain Intensity 1: 5  Pain Location 1: Back  Post Session:  0     THERAPEUTIC EXERCISE: (15 minutes):  Exercises per grid below to improve coordination. Required moderate verbal and tactile cues to promote proper body breathing techniques. Progressed repetitions and complexity of movement as indicated. MANUAL THERAPY: (30 minutes): Soft tissue mobilization was utilized and necessary because of the patient's restricted motion of soft tissue. (Used abbreviations: MET - muscle energy technique; SCS- Strain counter strain; CTM- Connective tissue mobilizations; CR- Contract/relax; SP- Sustained pressure, TrP- Trigger point release, IASTM- Instrument assisted soft tissue mobilizations, TDN- Trigger point dry needling). SCS and Trigger point to bilateral LA, OI, and perineum to reduce tone and improve tissue elasticity. Rolling to adductors and abdomen. TrP to right psoas. Strumming and trigger point to bulbocavernosus, ischiocavernosus, superficial transverse perineum  Hip lateral distraction right 3 x30 sec with belt (not performed)  Hip distraction long axis right x 20 sec   Prone hip mobilization PA   PA to left superior sacrum 30 sec   MET leftt LE extension and right LE flexion 3 x 7 sec  (not performed)    Exercises:  Patient instructed in pelvic floor exercises listed below:   Date:  10/24/2018     Activity/Exercise Parameters   Pt education     PF drops X 10 with external anal sensors and biofeedback.   5 min total.  Presented with good resting tone with biofeedback   Diaphragmatic breathing    Adductor isometric X 10 sec x 3   Perineum stretch 3 x 30 sec Piriformis stertch 3 x 30 sec    Hip flexor stretch 3 x 30 sec right   1/2 kneelling psoas stretch    squats    gait 2 min    bridge  x 10    TA sets X 10 poor coordination    Clams shells 2 x 15   Hip abduction     Cat stretch 3 x 30 sec   Prone hip extension  X 10 B           [employer] Portal    The following educational topics were reviewed with patient:    Treatment/Session Assessment:    · Response to Treatment: pt reported no pain after treatment today. Did have increased tone throughout the pelvic floor today. Will continue to work on stabilzation   · Compliance with Program/Exercises: Will assess as treatment progresses. · Recommendations/Intent for next treatment session: \"Next visit will focus on advancements to more challenging activities\".   Total Treatment Duration:  PT Patient Time In/Time Out  Time In: 1000  Time Out: [address] Mauri Greenwood PT

## 2018-10-31 ENCOUNTER — HOSPITAL ENCOUNTER (OUTPATIENT)
Dept: PHYSICAL THERAPY | Age: 23
Discharge: HOME | End: 2018-10-31
Payer: COMMERCIAL

## 2018-10-31 PROCEDURE — 97140 MANUAL THERAPY 1/> REGIONS: CPT

## 2018-10-31 PROCEDURE — 97110 THERAPEUTIC EXERCISES: CPT

## 2018-10-31 NOTE — PROGRESS NOTES
Farrukh Velazquez  : 1995  Primary: [facility]  Secondary:  [address] at [address] [id number] [facility], [address] [place] [address],[address], [facility] [address]  [phone]   [phone]          OUTPATIENT PHYSICAL THERAPY:Daily Note 10/31/2018    ICD-10: Treatment Diagnosis:   Myalgia (M79.1) Other lack of coordination (R27.8)Mixed incontinence (N39.46) Muscle weakness (generalized) (M62.81)  Precautions/Allergies:   Patient has no known allergies. Fall Risk Score: 0 (? 5 = High Risk)  MD Orders: Eval and treat MEDICAL/REFERRING DIAGNOSIS:  Other specified disorders of muscle [M62.89]   DATE OF ONSET: chronic   REFERRING PHYSICIAN: Alia Nye MD  RETURN PHYSICIAN APPOINTMENT: TBD     8/15/2018 INITIAL ASSESSMENT:  Ms. Elena Iglesiass  year old female with increased tone and muscle spasms of the pelvic floor that may be contributing to her leaking, pelvic pain and low back. She may also have some contributions from the pudendal n. She presents with tenderness at the external vaginal muscles as well as at the introitus and deep vaginal muscles of the LA and OI. She would benefit from skilled PT to address these deficits with the use of manual therapy, relaxation techniques, behavior modification and modalities as needed. PROBLEM LIST (Impacting functional limitations):  1. Increased Pain  2. Decreased Activity Tolerance  3. Decreased [place] with Home Exercise Program  4. Decreased strength and coordination of pelvic floor which limits bladder control   5. INTERVENTIONS PLANNED:  1. Neuromuscular re-education  2. Biofeedback as needed  3. HEP  4. Bladder retraining  5. Bladder education  6. Electrical Stimulation  7. Home Exercise Program (HEP)  8. Manual Therapy  9. Neuromuscular Re-education/Strengthening  10. Range of Motion (ROM)  11. Therapeutic Activites  12. Therapeutic Exercise/Strengthening   TREATMENT PLAN:  Effective Dates: 8/15/2018 TO 2018 (90 days). Frequency/Duration: 1 time a week for 90 Days  GOALS: (Goals have been discussed and agreed upon with patient.)  Short-Term Functional Goals: Time Frame: in 3 weeks  1. Patient will demonstrate independence with home exercise program.  2. Patient will demonstrated good coordination of pelvic floor muscles to improve continence. Discharge Goals: Time Frame: in 12 weeks  Decrease resting tone of pelvic floor muscles to 0 mv on SEMG biofeedback to demonstrate ability to relax pelvic floor  Patient will sleep uninterrupted by excessive nocturia no greater than 2x per night. Pt will be able to participate in 30 min of aerobic activities without urinary incontinence. Pt will report a 50 % decrease in urinary incontinent episodes per voiding log in order to decrease social anxiety. Rehabilitation Potential For Stated Goals: Good  Regarding Samuel Guzmán's therapy, I certify that the treatment plan above will be carried out by a therapist or under their direction. Thank you for this referral,  Suzanne Priest PT     Referring Physician Signature: Merrill Jacome MD              Date                    The information in this section was collected on 10/31/2018   (except where otherwise noted). HISTORY:   History of Present Injury/Illness (Reason for Referral):  Has always had frequency in urination. Had a baby 2016 and the frequency. Urinates every two hours. Second baby at 5 months had a foul smelling urine. Had pain on the right side. Pain is dependant on what she eats. Urine streams is two steady streams. Has pain instead of urgency which lets her know that she has to go to bathroom. Leaks about a teaspoon size per day. Second child was born last year. Has quite a bit of anxiety. Past Medical History/Comorbidities:   Ms. Arleth Bedoya  has a past medical history of IBS (irritable bowel syndrome). Ms. Arleth Bedoya  has a past surgical history that includes hx tonsil and adenoidectomy.   Social History/Living Environment:   Lives with fiance and two children  Prior Level of Function/Work/Activity:  Chronic issues with anxiety and urination  Previous Treatment Approaches:          URO/GYN  Lots of testing primary care. Checked out fine  Current Medications:    Current Outpatient Medications:     norgestimate-ethinyl estradiol (TRI-LO-SPRINTEC) 0.18/0.215/0.25 mg-25 mcg tab, Take 1 Tab by mouth daily. , Disp: 28 Tab, Rfl: 3   Date Last Reviewed:  10/31/2018   Voiding Patterns:  Patient voids every 2 hours during the day and 2-3 times during the night. Patient reports that she empties bladder . Feels relief when she empties bladder in regards to pain. Fluid Intake: Patient does not limit fluid intake to control bladder. Drinks 40oz of water. Coffee 2 x per week. Bowel Habits:  Patient demonstrates normal bowel habits. Personal / Social History:  · Sexually active? Yes. Painful with insertion and after sex she has pain at the vulva and at some afters. · Has had sexual trama       Number of Personal Factors/Comorbidities that affect the Plan of Care: 1-2: MODERATE COMPLEXITY   EXAMINATION:   Internal Observation:   · Voluntary Contraction: good with inability to relax    · Voluntary Relaxation: poor coordination   · Involuntary Contraction: NT  · Involuntary Relaxation: NT  · Perineal Body Assessment: tenderness  · Anal [place]: NT  · Skin Integrity:  Irritated rubor   · Q-tip Test: NT  · Vaginal Vault Size: within normal limits            Palpation:   Right Left   Bulbocavernosus     Ischiocavernosus     Superficial Transverse Perineal tenderness tenderness   Sphincter Urethrae     Compressor Urethra      Urethra-vaginalis     Deep Transverse Perineium tenderness tenderness   Obturator Internus Pain 6/10 Pain 3/10   Iliococcygeus Discomfort 2/10 Pain 3/10   Coccygeus NT Nt   Pubococcygeus Discomfort 2/10 Pain 7/10   Levator Ani     Adductor     Psoas Pain 6/10 Pain 2/10   Ant.  Abdominal wall Prolapse:  · Tissue support test with bearing down (Grade 1: not visible at introitus; Grade 2: visible at introitus; Grade 3: tissue outside introitus):  · Anterior Wall = 0  · Posterior Wall = 0   · Apical = 0       Body Structures Involved:  1. Muscles Body Functions Affected:  1. Genitourinary  2. Reproductive  3. Neuromusculoskeletal Activities and Participation Affected:  1. Self Care  2. Interpersonal Interactions and Relationships   Number of elements that affect the Plan of Care: 3: MODERATE COMPLEXITY   CLINICAL PRESENTATION:   Presentation: Evolving clinical presentation with changing clinical characteristics: MODERATE COMPLEXITY   CLINICAL DECISION MAKING:   Outcome Measure: Tool Used: Pelvic Floor Impact Questionnaire--short form 7 (PFIQ-7)   Score:  Initial:   · Bladder or Urine: 15  · Bowel or Rectum: 5  · Vagina or Pelvis: 4 Most Recent: X (Date: -- )  · Bladder or Urine: X  · Bowel or Rectum: X  · Vagina or Pelvis: X   Interpretation of Score: Each of the 7 sections is scored on a scale from 0-3; 0 representing \"Not at all\", 3 representing \"Quite a bit\". The mean value is taken from all the answered items, then multiplied by 100 to obtain the scale score, ranging from 0-100. This process is repeated for each column representing bowel, bladder, and pelvic pain. Medical Necessity:   · Patient is expected to demonstrate progress in coordination to increase independence with relaxation of pelvic floor muscles and improve strength. .  · Patient demonstrates good rehab potential due to higher previous functional level. Reason for Services/Other Comments:  · Patient continues to require skilled intervention due to improved continence.    Use of outcome tool(s) and clinical judgement create a POC that gives a: Questionable prediction of patient's progress: MODERATE COMPLEXITY   TREATMENT:   (In addition to Assessment/Re-Assessment sessions the following treatments were rendered)  Pre-treatment Symptoms/Complaints: Pt states she has not had a lot of back pain   Pain: Initial:   Pain Intensity 1: 5  Pain Location 1: Back 0/10 pain in pelvis. Post Session:  4/10 sacrum region     THERAPEUTIC EXERCISE: (25 minutes):  Exercises per grid below to improve coordination. Required moderate verbal and tactile cues to promote proper body breathing techniques. Progressed repetitions and complexity of movement as indicated. MANUAL THERAPY: (30 minutes): Soft tissue mobilization was utilized and necessary because of the patient's restricted motion of soft tissue. (Used abbreviations: MET - muscle energy technique; SCS- Strain counter strain; CTM- Connective tissue mobilizations; CR- Contract/relax; SP- Sustained pressure, TrP- Trigger point release, IASTM- Instrument assisted soft tissue mobilizations, TDN- Trigger point dry needling). SCS and Trigger point to bilateral LA, OI, and perineum to reduce tone and improve tissue elasticity. Rolling to adductors and abdomen. TrP to right psoas. CTM low back  Strumming and trigger point to bulbocavernosus, ischiocavernosus, superficial transverse perineum  Hip lateral distraction right 3 x30 sec with belt (not performed)  Hip distraction long axis right x 20 sec   Prone hip mobilization PA   PA to left superior sacrum 30 sec   MET leftt LE extension and right LE flexion 3 x 7 sec  (not performed)    Exercises:  Patient instructed in pelvic floor exercises listed below:   Date:  10/31/2018     Activity/Exercise Parameters   Pt education     PF drops X 10 with external anal sensors and biofeedback.   5 min total.  Presented with good resting tone with biofeedback   Diaphragmatic breathing    Adductor isometric X 10 sec x 3   Perineum stretch 3 x 30 sec   Piriformis stertch 3 x 30 sec    Hip flexor stretch 3 x 30 sec right   1/2 kneelling psoas stretch    squats    gait 2 min    bridge  x 10    TA sets X 10 poor coordination    Clams shells 2 x 15   4 point alt legs.  X 10 each   Cat stretch 3 x 30 sec   Prone hip extension  X 10 B   Hip adduction in sidelying X 10 B                       [facility] Portal    The following educational topics were reviewed with patient:    Treatment/Session Assessment:    · Response to Treatment: Pt had increased pressure at the rectum today. I did have an increased feeling of fullness. She did well with all exercises. She was encouraged to make daily bowel movement a goal of hers as I feel that it is increasing her leaking and the pressure on the sacrum. · Compliance with Program/Exercises: Will assess as treatment progresses. · Recommendations/Intent for next treatment session: \"Next visit will focus on advancements to more challenging activities\".   Total Treatment Duration:  PT Patient Time In/Time Out  Time In: 1000  Time Out: 1100    Nadine Carmona, PT

## 2018-11-14 ENCOUNTER — HOSPITAL ENCOUNTER (OUTPATIENT)
Dept: PHYSICAL THERAPY | Age: 23
Discharge: HOME | End: 2018-11-14
Payer: COMMERCIAL

## 2018-11-14 PROCEDURE — 97110 THERAPEUTIC EXERCISES: CPT

## 2018-11-14 PROCEDURE — 97140 MANUAL THERAPY 1/> REGIONS: CPT

## 2018-11-14 NOTE — THERAPY RECERTIFICATION
Farrukh Velazquez  : 1995  Primary: [facility]  Secondary:  [address] at [address] [address] [facility], [address] [place] [address],[address], [address]  [phone]   [phone]          OUTPATIENT PHYSICAL THERAPY:Daily Note and Re-evaluation 2018    ICD-10: Treatment Diagnosis:   Myalgia (M79.1) Other lack of coordination (R27.8)Mixed incontinence (N39.46) Muscle weakness (generalized) (M62.81)  Precautions/Allergies:   Patient has no known allergies. Fall Risk Score: 0 (? 5 = High Risk)  MD Orders: Eval and treat MEDICAL/REFERRING DIAGNOSIS:  Other specified disorders of muscle [M62.89]   DATE OF ONSET: chronic   REFERRING PHYSICIAN: Alia Nye MD  RETURN PHYSICIAN APPOINTMENT: TBD     2018  RE-EVALUATION:  Ms. Elena Fuller is a 24year old female who has been seen for 8 physical therapy visits. Pt states that she is about 50% better. Is no longer having pelvic pain daily. She has decreased her nocturia to 2 x per night. She did have some improvement with her leaking when she decreased her bladder irritants, but she continues to have stress incontinence. The tone throughout the pelvic floor continues to improve, however, she continues to have some discomfort. She has met all of her short term goals and 1/3 LTGs. She has been cancelled several of her therapy appointments due to illnesses in her family. Her compliance with bladder irritants is on and off. She had good compliance with her exercises. She continues to be a good candidate for therapy to work towards her remaining goals. 8/15/2018 INITIAL ASSESSMENT:  Ms. Elena Fuller  year old female with increased tone and muscle spasms of the pelvic floor that may be contributing to her leaking, pelvic pain and low back. She may also have some contributions from the pudendal n. She presents with tenderness at the external vaginal muscles as well as at the introitus and deep vaginal muscles of the LA and OI.   She would benefit from skilled PT to address these deficits with the use of manual therapy, relaxation techniques, behavior modification and modalities as needed. PROBLEM LIST (Impacting functional limitations):  1. Increased Pain  2. Decreased Activity Tolerance  3. Decreased [place] with Home Exercise Program  4. Decreased strength and coordination of pelvic floor which limits bladder control   5. INTERVENTIONS PLANNED:  1. Neuromuscular re-education  2. Biofeedback as needed  3. HEP  4. Bladder retraining  5. Bladder education  6. Electrical Stimulation  7. Home Exercise Program (HEP)  8. Manual Therapy  9. Neuromuscular Re-education/Strengthening  10. Range of Motion (ROM)  11. Therapeutic Activites  12. Therapeutic Exercise/Strengthening   TREATMENT PLAN:  Effective Dates:  11/14/2018 to 2/10/2018  Frequency/Duration: 1 time a week for 90 Days  GOALS: (Goals have been discussed and agreed upon with patient.)  Short-Term Functional Goals: Time Frame: in 3 weeks  1. Patient will demonstrate independence with home exercise program. (MET)  2. Patient will demonstrated good coordination of pelvic floor muscles to improve continence. (MET)  Discharge Goals: Time Frame: in 12 weeks  Decrease resting tone of pelvic floor muscles to 0 mv on SEMG biofeedback to demonstrate ability to relax pelvic floor (NT)  Patient will sleep uninterrupted by excessive nocturia no greater than 2x per night.(met)  Pt will be able to participate in 30 min of aerobic activities without urinary incontinence. (not met)  Pt will report a 50 % decrease in urinary incontinent episodes per voiding log in order to decrease social anxiety. (not met)    Rehabilitation Potential For Stated Goals: Good  Regarding Casandra Jasmyn Guzmán's therapy, I certify that the treatment plan above will be carried out by a therapist or under their direction.   Thank you for this referral,  Josefa Chacon, PT     Referring Physician Signature: Vicki Salomon, MD              Date                    The information in this section was collected on 11/14/2018   (except where otherwise noted). HISTORY:   History of Present Injury/Illness (Reason for Referral):  Has always had frequency in urination. Had a baby 2016 and the frequency. Urinates every two hours. Second baby at 5 months had a foul smelling urine. Had pain on the right side. Pain is dependant on what she eats. Urine streams is two steady streams. Has pain instead of urgency which lets her know that she has to go to bathroom. Leaks about a teaspoon size per day. Second child was born last year. Has quite a bit of anxiety. Past Medical History/Comorbidities:   Ms. Ernesto Gillespie  has a past medical history of IBS (irritable bowel syndrome). Ms. Ernesto Gillespie  has a past surgical history that includes hx tonsil and adenoidectomy. Social History/Living Environment:   Lives with fiance and two children  Prior Level of Function/Work/Activity:  Chronic issues with anxiety and urination  Previous Treatment Approaches:          URO/GYN  Lots of testing primary care. Checked out fine  Current Medications:    Current Outpatient Medications:     norgestimate-ethinyl estradiol (TRI-LO-SPRINTEC) 0.18/0.215/0.25 mg-25 mcg tab, Take 1 Tab by mouth daily. , Disp: 28 Tab, Rfl: 3   Date Last Reviewed:  11/14/2018   Voiding Patterns:  Patient voids every 2 hours during the day and 2-3 times during the night. Patient reports that she empties bladder . Feels relief when she empties bladder in regards to pain. Fluid Intake: Patient does not limit fluid intake to control bladder. Drinks 40oz of water. Coffee 2 x per week. Bowel Habits:  Patient demonstrates normal bowel habits. Personal / Social History:  · Sexually active? Yes. Painful with insertion and after sex she has pain at the vulva and at some afters.    · Has had sexual trama       Number of Personal Factors/Comorbidities that affect the Plan of Care: 1-2: MODERATE COMPLEXITY EXAMINATION:   Internal Observation:   · Voluntary Contraction: good with inability to relax    · Voluntary Relaxation: improved relxation 11/14/2018  · Involuntary Contraction: NT  · Involuntary Relaxation: NT  · Perineal Body Assessment: tenderness  · Anal [place]: NT  · Skin Integrity:  Irritated rubor   · Q-tip Test: NT  · Vaginal Vault Size: within normal limits            Palpation:  11/14/2018     Right Left   Bulbocavernosus     Ischiocavernosus     Superficial Transverse Perineal tenderness tenderness   Sphincter Urethrae     Compressor Urethra      Urethra-vaginalis     Deep Transverse Perineium tenderness tenderness   Obturator Internus Pain 4/10 Pain 3/10   Iliococcygeus Discomfort 2/10 Pain 3/10   Coccygeus NT Nt   Pubococcygeus Discomfort 2/10 Pain 7/10   Levator Ani     Adductor     Psoas Pain 2/10 Pain 2/10   Ant. Abdominal wall             Prolapse:  · Tissue support test with bearing down (Grade 1: not visible at introitus; Grade 2: visible at introitus; Grade 3: tissue outside introitus):  · Anterior Wall = 0  · Posterior Wall = 0   · Apical = 0       Body Structures Involved:  1. Muscles Body Functions Affected:  1. Genitourinary  2. Reproductive  3. Neuromusculoskeletal Activities and Participation Affected:  1. Self Care  2. Interpersonal Interactions and Relationships   Number of elements that affect the Plan of Care: 3: MODERATE COMPLEXITY   CLINICAL PRESENTATION:   Presentation: Evolving clinical presentation with changing clinical characteristics: MODERATE COMPLEXITY   CLINICAL DECISION MAKING:   Outcome Measure:    Tool Used: Pelvic Floor Impact Questionnaire--short form 7 (PFIQ-7)   Score:  Initial:   · Bladder or Urine: 15  · Bowel or Rectum: 5  · Vagina or Pelvis: 4 Most Recent: X (Date: -- )  · Bladder or Urine: 14  · Bowel or Rectum: 8  · Vagina or Pelvis: 10   Interpretation of Score: Each of the 7 sections is scored on a scale from 0-3; 0 representing \"Not at all\", 3 representing \"Quite a bit\". The mean value is taken from all the answered items, then multiplied by 100 to obtain the scale score, ranging from 0-100. This process is repeated for each column representing bowel, bladder, and pelvic pain. Medical Necessity:   · Patient is expected to demonstrate progress in coordination to increase independence with relaxation of pelvic floor muscles and improve strength. .  · Patient demonstrates good rehab potential due to higher previous functional level. Reason for Services/Other Comments:  · Patient continues to require skilled intervention due to improved continence. Use of outcome tool(s) and clinical judgement create a POC that gives a: Questionable prediction of patient's progress: MODERATE COMPLEXITY   TREATMENT:   (In addition to Assessment/Re-Assessment sessions the following treatments were rendered)  Pre-treatment Symptoms/Complaints:  Pt reports that she is about to start her menstrual cycle. She did get the stomach virus and had passed increased stool. So she does not have constipation at this time. The leaking is not much better. Is leaking now both ways. Does now know that coffeee has a lot to do with the irritation. Is leaking about 2 quarter worth so it is not much. Getting up only 1-2 x per night. Feels about 50% better overall. Pain: Initial:   Has bilateral hip pain Post Session  0/10     THERAPEUTIC EXERCISE: (15 minutes):  Exercises per grid below to improve coordination. Required moderate verbal and tactile cues to promote proper body breathing techniques. Progressed repetitions and complexity of movement as indicated. MANUAL THERAPY: (40 minutes): Soft tissue mobilization was utilized and necessary because of the patient's restricted motion of soft tissue.         (Used abbreviations: MET - muscle energy technique; SCS- Strain counter strain; CTM- Connective tissue mobilizations; CR- Contract/relax; SP- Sustained pressure, TrP- Trigger point release, IASTM- Instrument assisted soft tissue mobilizations, TDN- Trigger point dry needling). SCS and Trigger point to bilateral LA, OI, and perineum to reduce tone and improve tissue elasticity. Rolling to adductors and abdomen. TrP to right psoas. Strumming and trigger point to bulbocavernosus, ischiocavernosus, superficial transverse perineum  Hip lateral distraction right 3 x30 sec with belt (not performed)  Hip distraction long axis right x 20 sec (not performed)  Prone hip mobilization PA (not performed)  PA to left superior sacrum 30 sec (not performed)  MET leftt LE extension and right LE flexion 3 x 7 sec  (not performed)    Exercises:  Patient instructed in pelvic floor exercises listed below:   Date:  11/14/2018     Activity/Exercise Parameters   Pt education     PF drops X 5  D/C due to increased pain   Diaphragmatic breathing 3 min    Adductor isometric    Perineum stretch 3 x 30 sec   Piriformis stertch 3 x 30 sec    Hip flexor stretch 3 x 30 sec right   1/2 kneelling psoas stretch    squats    gait    bridge    TA sets    Clams shells    4 point alt legs. Cat stretch    Prone hip extension     Hip adduction in sidelying                        [employer] Portal    The following educational topics were reviewed with patient:    Treatment/Session Assessment:    · Response to Treatment: Pt states that she feels good overall. No pain at hips or pelvis after treatment. · Compliance with Program/Exercises: Will assess as treatment progresses. · Recommendations/Intent for next treatment session: \"Next visit will focus on advancements to more challenging activities\".   Total Treatment Duration:  PT Patient Time In/Time Out  Time In: 1005  Time Out: 1100    Fidelina Jaramillo, PT

## 2018-12-12 ENCOUNTER — HOSPITAL ENCOUNTER (OUTPATIENT)
Dept: PHYSICAL THERAPY | Age: 23
Discharge: HOME | End: 2018-12-12
Payer: COMMERCIAL

## 2018-12-12 PROCEDURE — 97140 MANUAL THERAPY 1/> REGIONS: CPT
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
## 2019-02-12 NOTE — PROGRESS NOTES
Jennifer Armenta : 1995 Primary: [facility] Secondary:  [facility] [place] [address], [address], [facility]. [phone]   [phone] OUTPATIENT PHYSICAL THERAPY:Discontinuation Summary 2019 ICD-10: Treatment Diagnosis: Myalgia (M79.1) Other lack of coordination (R27.8)Mixed incontinence (N39.46) Muscle weakness (generalized) (M62.81) Precautions/Allergies:  
Patient has no known allergies. Fall Risk Score: 0 (? 5 = High Risk) MD Orders: Eval and treat MEDICAL/REFERRING DIAGNOSIS: 
Other specified disorders of muscle [M62.89] DATE OF ONSET: chronic REFERRING PHYSICIAN: Amalia Evans MD 
RETURN PHYSICIAN APPOINTMENT: TBD Discontinuation Summary:  Pt did not return to any future PT visit. Objective measurements are unknown at this time. Will consider D/C at this time. 2018  RE-EVALUATION:  Ms. Hesham Heck is a 24year old female who has been seen for 8 physical therapy visits. Pt states that she is about 50% better. Is no longer having pelvic pain daily. She has decreased her nocturia to 2 x per night. She did have some improvement with her leaking when she decreased her bladder irritants, but she continues to have stress incontinence. The tone throughout the pelvic floor continues to improve, however, she continues to have some discomfort. She has met all of her short term goals and 1/3 LTGs. She has been cancelled several of her therapy appointments due to illnesses in her family. Her compliance with bladder irritants is on and off. She had good compliance with her exercises. She continues to be a good candidate for therapy to work towards her remaining goals. 8/15/2018 INITIAL ASSESSMENT:  Ms. Hesham Heck  year old female with increased tone and muscle spasms of the pelvic floor that may be contributing to her leaking, pelvic pain and low back.  She may also have some contributions from the pudendal n. She presents with tenderness at the external vaginal muscles as well as at the introitus and deep vaginal muscles of the LA and OI. She would benefit from skilled PT to address these deficits with the use of manual therapy, relaxation techniques, behavior modification and modalities as needed. PROBLEM LIST (Impacting functional limitations): 
1. Increased Pain 2. Decreased Activity Tolerance 3. Decreased [place] with Home Exercise Program 
4. Decreased strength and coordination of pelvic floor which limits bladder control 5. INTERVENTIONS PLANNED: 
1. Neuromuscular re-education 2. Biofeedback as needed 3. HEP 4. Bladder retraining 5. Bladder education 6. Electrical Stimulation 7. Home Exercise Program (HEP) 8. Manual Therapy 9. Neuromuscular Re-education/Strengthening 10. Range of Motion (ROM) 11. Therapeutic Activites 12. Therapeutic Exercise/Strengthening TREATMENT PLAN: 
Effective Dates:  11/14/2018 to 2/10/2018  Frequency/Duration: 1 time a week for 90 DaysGOALS: (Goals have been discussed and agreed upon with patient.) Short-Term Functional Goals: Time Frame: in 3 weeks 1. Patient will demonstrate independence with home exercise program. (MET) 2. Patient will demonstrated good coordination of pelvic floor muscles to improve continence. (MET) Discharge Goals: Time Frame: in 12 weeks Decrease resting tone of pelvic floor muscles to 0 mv on SEMG biofeedback to demonstrate ability to relax pelvic floor (NT) Patient will sleep uninterrupted by excessive nocturia no greater than 2x per night.(met) Pt will be able to participate in 30 min of aerobic activities without urinary incontinence. (not met) Pt will report a 50 % decrease in urinary incontinent episodes per voiding log in order to decrease social anxiety. (not met) Rehabilitation Potential For Stated Goals: Good Regarding [facility]'s therapy, I certify that the treatment plan above will be carried out by a therapist or under their direction. Thank you for this referral, Butch Apple, PT